# Patient Record
Sex: FEMALE | Race: WHITE | HISPANIC OR LATINO
[De-identification: names, ages, dates, MRNs, and addresses within clinical notes are randomized per-mention and may not be internally consistent; named-entity substitution may affect disease eponyms.]

---

## 2019-05-17 ENCOUNTER — RESULT REVIEW (OUTPATIENT)
Age: 44
End: 2019-05-17

## 2021-03-23 PROBLEM — Z00.00 ENCOUNTER FOR PREVENTIVE HEALTH EXAMINATION: Status: ACTIVE | Noted: 2021-03-23

## 2021-03-24 ENCOUNTER — APPOINTMENT (OUTPATIENT)
Dept: OTOLARYNGOLOGY | Facility: CLINIC | Age: 46
End: 2021-03-24
Payer: COMMERCIAL

## 2021-03-24 ENCOUNTER — NON-APPOINTMENT (OUTPATIENT)
Age: 46
End: 2021-03-24

## 2021-03-24 VITALS — HEIGHT: 71 IN | WEIGHT: 220 LBS | TEMPERATURE: 97.7 F | BODY MASS INDEX: 30.8 KG/M2

## 2021-03-24 DIAGNOSIS — J34.2 DEVIATED NASAL SEPTUM: ICD-10-CM

## 2021-03-24 DIAGNOSIS — J31.0 CHRONIC RHINITIS: ICD-10-CM

## 2021-03-24 DIAGNOSIS — R42 DIZZINESS AND GIDDINESS: ICD-10-CM

## 2021-03-24 PROCEDURE — 99203 OFFICE O/P NEW LOW 30 MIN: CPT | Mod: 25

## 2021-03-24 PROCEDURE — 31231 NASAL ENDOSCOPY DX: CPT

## 2021-03-24 PROCEDURE — 99072 ADDL SUPL MATRL&STAF TM PHE: CPT

## 2021-03-24 NOTE — HISTORY OF PRESENT ILLNESS
[de-identified] : CIRO BOSWELL is a 46 year patient Here for a left nasal congestion, crusting, dryness, and occasional bleeding. Her symptoms are worse in the morning. She uses nasal saline irrigations. She has no history of nasal trauma or nasal/sinus surgery. She does suffer from allergies. she had testing in the past. She did use a moisturizing nasal gel in the past but not recently. She has no throat symptoms. On February 5, she had a 3-4 day history of vertigo. It was severe at first and then improved. She had no headache, nausea, light sensitivity, tinnitus, ear fullness or hearing loss. One week later she had COVID. The dizziness resolved and she has not had any further episodes.  Her questionnaire was reviewed.

## 2021-03-24 NOTE — ASSESSMENT
[FreeTextEntry1] : She has a history of chronic rhinitis. She has dryness with crusting and occasional bleeding. This is likely due to the deviated nasal septum. She also has allergies. Nasal endoscopy showed a deviated septum and inferior turbinate hypertrophy.\par \par She had an episode of vertigo early last month about one week before she developed COVID.  It may have been related to the infection. She denies hearing loss or other ear symptoms\par \par PLAN\par \par -findings and management options discussed in detail with the patient. \par -Nasal saline rinses as long as it does not cause dryness\par -humidifier and moisturizing nasal gel as needed\par -consider trying Breathe Right strips\par -allergy management and antihistamines as needed. \par -She could consider septoplasty and inferior turbinate reduction to improve her breathing. She will see how she does with conservative management. I did review the procedure and some of the risks/benefits. \par -I recommended an audiogram to check for hearing loss given episode of vertigo. Sudden sensorineural hearing loss has been associated with Covid infection. She declined today. She said that she feels her hearing is normal. I asked her to let me know if she has recurrent episodes of dizziness.\par -follow up in 3 weeks unless her symptoms have completely resolved.\par -call and return earlier if any concerns. \par